# Patient Record
Sex: FEMALE | Race: WHITE | NOT HISPANIC OR LATINO | Employment: UNEMPLOYED | URBAN - NONMETROPOLITAN AREA
[De-identification: names, ages, dates, MRNs, and addresses within clinical notes are randomized per-mention and may not be internally consistent; named-entity substitution may affect disease eponyms.]

---

## 2017-01-10 ENCOUNTER — TELEPHONE (OUTPATIENT)
Dept: ORTHOPEDIC SURGERY | Facility: CLINIC | Age: 62
End: 2017-01-10

## 2017-02-07 NOTE — TELEPHONE ENCOUNTER
----- Message from Delphine Goodwin sent at 2/7/2017  2:09 PM CST -----  PATIENT CALLED STATING SHE HAD AN EPIDURAL ON 12/21/2016 AND HAD BEEN DOING GREAT UNTIL LAST Saturday WHEN SHE STARTED HAVING SEVERE BACK PAIN.  SHE IS REQUESTING A NEW PRESCRIPTION FOR PERCOCET 10MG.  THAT IS WHAT SHE HAD BEEN TAKING IT BEFORE.

## 2017-02-08 RX ORDER — OXYCODONE AND ACETAMINOPHEN 7.5; 325 MG/1; MG/1
1 TABLET ORAL EVERY 6 HOURS PRN
Qty: 30 TABLET | Refills: 0 | Status: SHIPPED | OUTPATIENT
Start: 2017-02-08 | End: 2017-02-20 | Stop reason: SDUPTHER

## 2017-02-20 RX ORDER — OXYCODONE AND ACETAMINOPHEN 7.5; 325 MG/1; MG/1
1 TABLET ORAL EVERY 6 HOURS PRN
Qty: 70 TABLET | Refills: 0 | Status: SHIPPED | OUTPATIENT
Start: 2017-02-20 | End: 2017-03-24 | Stop reason: SDUPTHER

## 2017-02-20 NOTE — TELEPHONE ENCOUNTER
----- Message from Lakeshia Cruz sent at 2/20/2017  1:23 PM CST -----  BB- oxycodone 7.5 because next available appointment is on MARCH 24,17

## 2017-03-24 ENCOUNTER — OFFICE VISIT (OUTPATIENT)
Dept: ORTHOPEDIC SURGERY | Facility: CLINIC | Age: 62
End: 2017-03-24

## 2017-03-24 VITALS — WEIGHT: 188 LBS | HEIGHT: 65 IN | BODY MASS INDEX: 31.32 KG/M2

## 2017-03-24 DIAGNOSIS — M54.50 LOW BACK PAIN WITHOUT SCIATICA, UNSPECIFIED BACK PAIN LATERALITY, UNSPECIFIED CHRONICITY: ICD-10-CM

## 2017-03-24 DIAGNOSIS — M51.26 DISPLACEMENT OF LUMBAR INTERVERTEBRAL DISC WITHOUT MYELOPATHY: Primary | ICD-10-CM

## 2017-03-24 DIAGNOSIS — M48.061 SPINAL STENOSIS OF LUMBAR REGION: ICD-10-CM

## 2017-03-24 PROCEDURE — 99213 OFFICE O/P EST LOW 20 MIN: CPT | Performed by: ORTHOPAEDIC SURGERY

## 2017-03-24 RX ORDER — OXYCODONE AND ACETAMINOPHEN 7.5; 325 MG/1; MG/1
1 TABLET ORAL EVERY 6 HOURS PRN
Qty: 70 TABLET | Refills: 0 | Status: SHIPPED | OUTPATIENT
Start: 2017-03-24 | End: 2017-04-07 | Stop reason: SDUPTHER

## 2017-03-24 NOTE — PROGRESS NOTES
"Flaca Bhandari is a 61 y.o. female returns for     Chief Complaint   Patient presents with   • Lumbar Spine - Follow-up       HISTORY OF PRESENT ILLNESS: Patients last epidural injection was 12/22/16  States epidural injection was helpful, and had significant improvement.  Complains of back pain.         CONCURRENT MEDICAL HISTORY:    Past Medical History:   Diagnosis Date   • Anxiety        Allergies   Allergen Reactions   • Methocarbamol          Current Outpatient Prescriptions:   •  amitriptyline (ELAVIL) 25 MG tablet, Take 25 mg by mouth every night., Disp: , Rfl:   •  aspirin 81 MG chewable tablet, Chew 81 mg daily., Disp: , Rfl:   •  atorvastatin (LIPITOR) 80 MG tablet, Take 80 mg by mouth daily., Disp: , Rfl:   •  cyclobenzaprine (FLEXERIL) 10 MG tablet, Take 10 mg by mouth 3 (three) times a day as needed for muscle spasms., Disp: , Rfl:   •  diclofenac sodium (VOTAREN XR) 100 MG 24 hr tablet, TAKE 1 TABLET DAILY., Disp: , Rfl: 1  •  HYDROcodone-acetaminophen (NORCO)  MG per tablet, Take 1 tablet by mouth Every 6 (Six) Hours As Needed for moderate pain (4-6)., Disp: 70 tablet, Rfl: 0  •  LORazepam (ATIVAN) 1 MG tablet, Take 1 mg by mouth every 8 (eight) hours as needed for anxiety., Disp: , Rfl:   •  oxyCODONE-acetaminophen (PERCOCET) 7.5-325 MG per tablet, Take 1 tablet by mouth Every 6 (Six) Hours As Needed for moderate pain (4-6)., Disp: 70 tablet, Rfl: 0  •  venlafaxine XR (EFFEXOR-XR) 75 MG 24 hr capsule, , Disp: , Rfl:     Past Surgical History:   Procedure Laterality Date   • HYSTERECTOMY  1985   • SPINE SURGERY  1997       ROS  No fevers or chills.  No chest pain or shortness of air.  No GI or  disturbances.    PHYSICAL EXAMINATION:       Ht 65\" (165.1 cm)  Wt 188 lb (85.3 kg)  BMI 31.28 kg/m2    Physical Exam    GAIT:     []  Normal  []  Antalgic    Assistive device: []  None  []  Walker     []  Crutches  []  Cane     []  Wheelchair  []  Stretcher    Right Ankle Exam     Muscle Strength "   Dorsiflexion:  5/5  Plantar flexion:  5/5  Anterior tibial:  5/5  Posterior tibial:  5/5  Gastrocsoleus:  5/5  Peroneal muscle:  5/5      Left Ankle Exam     Muscle Strength   Dorsiflexion:  5/5   Plantar flexion:  5/5   Anterior tibial:  5/5   Posterior tibial:  5/5  Gastrocsoleus:  5/5  Peroneal muscle:  5/5      Right Hip Exam     Range of Motion   Internal Rotation: normal   External Rotation: normal     Muscle Strength   Abduction: 5/5   Flexion: 5/5       Left Hip Exam     Range of Motion   Internal Rotation: normal   External Rotation: normal     Muscle Strength   Abduction: 5/5   Flexion: 5/5       Back Exam     Tenderness   The patient is experiencing tenderness in the lumbar.    Range of Motion   Extension: 30   Flexion:  60 normal   Lateral Bend Right: 10   Lateral Bend Left: 10   Rotation Right: 30   Rotation Left: 30     Muscle Strength   Right Quadriceps:  5/5   Left Quadriceps:  5/5   Right Hamstrings:  5/5   Left Hamstrings:  5/5     Tests   Straight leg raise right: negative  Straight leg raise left: negative    Reflexes   Patellar: 2/4  Achilles: 2/4  Biceps: 2/4  Babinski's sign: normal     Other   Sensation: normal  Gait: normal   Erythema: no back redness  Scars: absent            Painful lumbar region, right lumbar.        No results found.          ASSESSMENT:    Diagnoses and all orders for this visit:    Low back pain without sciatica, unspecified back pain laterality, unspecified chronicity    Displacement of lumbar intervertebral disc without myelopathy    Spinal stenosis of lumbar region          PLAN    Schedule for another epidural injection of lumbar spine.    Ismael Jin MD

## 2017-04-07 RX ORDER — OXYCODONE AND ACETAMINOPHEN 7.5; 325 MG/1; MG/1
1 TABLET ORAL EVERY 6 HOURS PRN
Qty: 70 TABLET | Refills: 0 | Status: SHIPPED | OUTPATIENT
Start: 2017-04-07 | End: 2017-05-05

## 2017-04-07 NOTE — TELEPHONE ENCOUNTER
----- Message from Pam Oglesby sent at 4/7/2017  1:07 PM CDT -----  Dr. Jin,    Patient is requesting a refill on her Percocet 7.5mg.    Pt's # [phone]    -Pam

## 2017-04-21 ENCOUNTER — TELEPHONE (OUTPATIENT)
Dept: ORTHOPEDIC SURGERY | Facility: CLINIC | Age: 62
End: 2017-04-21

## 2017-04-21 DIAGNOSIS — M51.26 DISPLACEMENT OF LUMBAR INTERVERTEBRAL DISC WITHOUT MYELOPATHY: ICD-10-CM

## 2017-04-21 DIAGNOSIS — M48.061 SPINAL STENOSIS OF LUMBAR REGION: Primary | ICD-10-CM

## 2017-04-26 ENCOUNTER — HOSPITAL ENCOUNTER (OUTPATIENT)
Dept: INTERVENTIONAL RADIOLOGY/VASCULAR | Facility: HOSPITAL | Age: 62
Discharge: HOME | End: 2017-04-26
Admitting: ORTHOPAEDIC SURGERY

## 2017-04-26 VITALS
SYSTOLIC BLOOD PRESSURE: 153 MMHG | HEART RATE: 84 BPM | DIASTOLIC BLOOD PRESSURE: 76 MMHG | RESPIRATION RATE: 18 BRPM | OXYGEN SATURATION: 96 %

## 2017-04-26 DIAGNOSIS — M48.061 SPINAL STENOSIS OF LUMBAR REGION: ICD-10-CM

## 2017-04-26 DIAGNOSIS — M51.26 DISPLACEMENT OF LUMBAR INTERVERTEBRAL DISC WITHOUT MYELOPATHY: ICD-10-CM

## 2017-04-26 PROCEDURE — 25010000002 METHYLPREDNISOLONE PER 80 MG: Performed by: ORTHOPAEDIC SURGERY

## 2017-04-26 PROCEDURE — 0 IOPAMIDOL 41 % SOLUTION: Performed by: ORTHOPAEDIC SURGERY

## 2017-04-26 PROCEDURE — 64483 NJX AA&/STRD TFRM EPI L/S 1: CPT | Performed by: ORTHOPAEDIC SURGERY

## 2017-04-26 RX ORDER — BUPIVACAINE HYDROCHLORIDE 5 MG/ML
INJECTION, SOLUTION EPIDURAL; INTRACAUDAL
Status: COMPLETED | OUTPATIENT
Start: 2017-04-26 | End: 2017-04-26

## 2017-04-26 RX ORDER — METHYLPREDNISOLONE ACETATE 80 MG/ML
INJECTION, SUSPENSION INTRA-ARTICULAR; INTRALESIONAL; INTRAMUSCULAR; SOFT TISSUE
Status: COMPLETED | OUTPATIENT
Start: 2017-04-26 | End: 2017-04-26

## 2017-04-26 RX ADMIN — IOPAMIDOL 5 ML: 408 INJECTION, SOLUTION INTRATHECAL at 14:49

## 2017-04-26 RX ADMIN — BUPIVACAINE HYDROCHLORIDE 10 ML: 5 INJECTION, SOLUTION EPIDURAL; INTRACAUDAL; PERINEURAL at 14:48

## 2017-04-26 RX ADMIN — METHYLPREDNISOLONE ACETATE 80 MG: 80 INJECTION, SUSPENSION INTRA-ARTICULAR; INTRALESIONAL; INTRAMUSCULAR; SOFT TISSUE at 14:49

## 2017-05-05 RX ORDER — OXYCODONE AND ACETAMINOPHEN 10; 325 MG/1; MG/1
1 TABLET ORAL EVERY 4 HOURS PRN
Qty: 80 TABLET | Refills: 0 | Status: SHIPPED | OUTPATIENT
Start: 2017-05-05 | End: 2017-05-26 | Stop reason: SDUPTHER

## 2017-05-08 ENCOUNTER — TELEPHONE (OUTPATIENT)
Dept: ORTHOPEDIC SURGERY | Facility: CLINIC | Age: 62
End: 2017-05-08

## 2017-05-08 DIAGNOSIS — M54.5 LOW BACK PAIN, UNSPECIFIED BACK PAIN LATERALITY, UNSPECIFIED CHRONICITY, WITH SCIATICA PRESENCE UNSPECIFIED: Primary | ICD-10-CM

## 2017-05-31 RX ORDER — OXYCODONE AND ACETAMINOPHEN 10; 325 MG/1; MG/1
1 TABLET ORAL EVERY 4 HOURS PRN
Qty: 80 TABLET | Refills: 0 | Status: SHIPPED | OUTPATIENT
Start: 2017-05-31 | End: 2018-02-23

## 2017-06-16 ENCOUNTER — TELEPHONE (OUTPATIENT)
Dept: ORTHOPEDIC SURGERY | Facility: CLINIC | Age: 62
End: 2017-06-16

## 2017-06-16 DIAGNOSIS — M51.26 DISPLACEMENT OF LUMBAR INTERVERTEBRAL DISC WITHOUT MYELOPATHY: Primary | ICD-10-CM

## 2017-06-16 DIAGNOSIS — M48.061 SPINAL STENOSIS, LUMBAR REGION, WITHOUT NEUROGENIC CLAUDICATION: ICD-10-CM

## 2017-06-16 NOTE — TELEPHONE ENCOUNTER
Patient would prefer to see a Dr Peres in [place] for pain management instead of Dr Boswell.   [place] is closer for her to travel to.    Dr Peres # is [phone]     Referral will be sent to [place] for scheduling

## 2017-06-23 RX ORDER — OXYCODONE AND ACETAMINOPHEN 7.5; 325 MG/1; MG/1
1 TABLET ORAL EVERY 6 HOURS PRN
Qty: 60 TABLET | Refills: 0 | Status: SHIPPED | OUTPATIENT
Start: 2017-06-23 | End: 2018-02-23

## 2017-06-23 RX ORDER — PROMETHAZINE HCL 50 MG
25 TABLET ORAL EVERY 6 HOURS PRN
Qty: 60 TABLET | Refills: 0 | Status: SHIPPED | OUTPATIENT
Start: 2017-06-23 | End: 2018-02-23

## 2017-07-18 ENCOUNTER — APPOINTMENT (OUTPATIENT)
Dept: LAB | Facility: HOSPITAL | Age: 62
End: 2017-07-18

## 2017-07-18 ENCOUNTER — OFFICE VISIT (OUTPATIENT)
Dept: PAIN MEDICINE | Facility: CLINIC | Age: 62
End: 2017-07-18

## 2017-07-18 VITALS
HEIGHT: 65 IN | SYSTOLIC BLOOD PRESSURE: 140 MMHG | DIASTOLIC BLOOD PRESSURE: 80 MMHG | BODY MASS INDEX: 30.17 KG/M2 | WEIGHT: 181.1 LBS

## 2017-07-18 DIAGNOSIS — Z79.899 HIGH RISK MEDICATIONS (NOT ANTICOAGULANTS) LONG-TERM USE: ICD-10-CM

## 2017-07-18 DIAGNOSIS — M54.16 LUMBAR RADICULOPATHY: ICD-10-CM

## 2017-07-18 DIAGNOSIS — M51.36 DDD (DEGENERATIVE DISC DISEASE), LUMBAR: Primary | ICD-10-CM

## 2017-07-18 PROCEDURE — G0481 DRUG TEST DEF 8-14 CLASSES: HCPCS | Performed by: PAIN MEDICINE

## 2017-07-18 PROCEDURE — 80307 DRUG TEST PRSMV CHEM ANLYZR: CPT | Performed by: PAIN MEDICINE

## 2017-07-18 PROCEDURE — 99204 OFFICE O/P NEW MOD 45 MIN: CPT | Performed by: NURSE PRACTITIONER

## 2017-07-18 RX ORDER — TRAZODONE HYDROCHLORIDE 50 MG/1
TABLET ORAL
Refills: 3 | COMMUNITY
Start: 2017-07-13

## 2017-07-18 RX ORDER — GABAPENTIN 300 MG/1
300 CAPSULE ORAL 3 TIMES DAILY
Qty: 90 CAPSULE | Refills: 1 | Status: SHIPPED | OUTPATIENT
Start: 2017-07-18 | End: 2017-11-20 | Stop reason: SDUPTHER

## 2017-07-18 RX ORDER — HYDROCODONE BITARTRATE AND ACETAMINOPHEN 10; 325 MG/1; MG/1
1 TABLET ORAL 4 TIMES DAILY
Qty: 120 TABLET | Refills: 0 | Status: SHIPPED | OUTPATIENT
Start: 2017-07-18 | End: 2017-08-17

## 2017-07-18 NOTE — PROGRESS NOTES
Flaca Burdick is a 61 y.o. female.   1955    HPI:   Location: lower back  Quality: aching, throbbing and dull  Severity: 7/10  Timing: constant  Alleviating: pain medication  Aggravating: sitting  and standing      Low back pain and right post leg pain.  Right upper buttock is the worst.  Has had this about 2 years.  Had discectomy 20 years ago.  No surgery.  Has had tfesi epidurals.  They helped, but did not last very long.  Has been to PT without much relief.  Was taking MR's but not much relief.  Taking percocet but started bothering her stomach.  States hydrocodone helped previously.  Has not had opioid for about a week.  States the opioid really helps her pain control and allows her to be more active.  Has been on opioid for about 1.5 years.  Has never been on gabapentin.      MRI from November 2016    Patient Name- FLACA BURDICK  Patient ID- [id number]   Ordering- ANJEL NARANJO MD  Attending- ANJEL NARANJO MD  Referring- ANJEL NARANJO MD  ------------------------------------------------  Procedure- MRI lumbar spine without contrast      Reason for exam- Other intervertebral disc displacement, lumbar  region.      Findings- Multisequence multiplanar MR imaging of the lumbar spine was  performed without contrast. Lumbarization of the S1 vertebral body is  seen. Otherwise lumbar spine vertebral body heights and alignment are  unremarkable. There is no evidence of fracture or dislocation. L5  vertebral body oval high signal lesion on T1 and T2 weighting is seen  consistent with benign hemangioma. Conus of the spinal cord appears  normal with tip at the T12-L1 intervertebral disc space level.      T12-L1- Disc space is normal. No disc protrusion or extrusion. Spinal  cord and nerve roots exit without compromise.      L1-L2- Disc space normal. No disc protrusion or extrusion. Nerve roots  exit without compromise.      L2-L3- Disc space normal. No disc protrusion or extrusion. Nerve roots  exit  without compromise.      L3-L4- Disc space normal. No disc protrusion or extrusion. Nerve roots  exit without compromise.      L4-L5- Anterior broad-based mild disc protrusion. Nerve roots exit  this disc space level without compromise.      L5-S1- Loss of disc space height with diffuse low disc signal suggests  desiccation from degenerative disc disease. Very mild disc protrusion  minimally indenting the anterior thecal sac. Nerve roots exit without  compromise.      Impression-  1. Lumbarization of the S1 vertebral body.  2. L5-S1- Loss of disc space height with diffuse low disc signal  suggests desiccation from degenerative disc disease. Very mild disc  protrusion minimally indenting the anterior thecal sac. Nerve roots  exit without compromise.  3. L4-L5- Anterior broad-based mild disc protrusion. Nerve roots exit  this disc space level without compromise.  4. No acute MR lumbar spine abnormality.      Electronically Signed By- Medhat Reyna MD On: 2016-11-16 15:09:01      The following portions of the patient's history were reviewed by me and updated as appropriate: allergies, current medications, past family history, past medical history, past social history, past surgical history and problem list.    Past Medical History:   Diagnosis Date   • Anxiety    • Chronic pain disorder    • Depression    • Hypercholesteremia    • Low back pain        Social History     Social History   • Marital status:      Spouse name: N/A   • Number of children: N/A   • Years of education: N/A     Occupational History   • Not on file.     Social History Main Topics   • Smoking status: Never Smoker   • Smokeless tobacco: Never Used   • Alcohol use Yes      Comment: LESS THAN 12 DRINKS IN THE PAST YEAR   • Drug use: No   • Sexual activity: Defer     Other Topics Concern   • Not on file     Social History Narrative       Family History   Problem Relation Age of Onset   • Heart disease Mother    • Alcohol abuse Father    •  Hyperlipidemia Father    • Hypertension Father    • COPD Sister          Current Outpatient Prescriptions:   •  aspirin 81 MG chewable tablet, Chew 81 mg daily., Disp: , Rfl:   •  atorvastatin (LIPITOR) 80 MG tablet, Take 80 mg by mouth daily., Disp: , Rfl:   •  promethazine (PHENERGAN) 50 MG tablet, Take 0.5 tablets by mouth Every 6 (Six) Hours As Needed for Nausea or Vomiting., Disp: 60 tablet, Rfl: 0  •  venlafaxine XR (EFFEXOR-XR) 75 MG 24 hr capsule, , Disp: , Rfl:   •  amitriptyline (ELAVIL) 25 MG tablet, Take 25 mg by mouth every night., Disp: , Rfl:   •  cyclobenzaprine (FLEXERIL) 10 MG tablet, Take 10 mg by mouth 3 (three) times a day as needed for muscle spasms., Disp: , Rfl:   •  diclofenac sodium (VOTAREN XR) 100 MG 24 hr tablet, TAKE 1 TABLET DAILY., Disp: , Rfl: 1  •  gabapentin (NEURONTIN) 300 MG capsule, Take 1 capsule by mouth 3 (Three) Times a Day., Disp: 90 capsule, Rfl: 1  •  HYDROcodone-acetaminophen (NORCO)  MG per tablet, Take 1 tablet by mouth 4 (Four) Times a Day for 30 days., Disp: 120 tablet, Rfl: 0  •  LORazepam (ATIVAN) 1 MG tablet, Take 1 mg by mouth every 8 (eight) hours as needed for anxiety., Disp: , Rfl:   •  oxyCODONE-acetaminophen (PERCOCET)  MG per tablet, Take 1 tablet by mouth Every 4 (Four) Hours As Needed for Moderate Pain (4-6)., Disp: 80 tablet, Rfl: 0  •  oxyCODONE-acetaminophen (PERCOCET) 7.5-325 MG per tablet, Take 1 tablet by mouth Every 6 (Six) Hours As Needed for Moderate Pain (4-6)., Disp: 60 tablet, Rfl: 0  •  traZODone (DESYREL) 50 MG tablet, TK 1 T PO Q NIGHT, Disp: , Rfl: 3    Allergies   Allergen Reactions   • Methocarbamol        Review of Systems   Musculoskeletal: Positive for back pain.   Psychiatric/Behavioral: Positive for dysphoric mood. The patient is nervous/anxious.    All other systems reviewed and are negative.    All review of systems reviewed and negative except for above.    Physical Exam   Constitutional: She is oriented to person,  place, and time. She appears well-developed and well-nourished. She does not appear ill. No distress.   HENT:   Head: Normocephalic and atraumatic.   Right Ear: Hearing normal.   Left Ear: Hearing normal.   Eyes: Conjunctivae and EOM are normal. Pupils are equal, round, and reactive to light.   Neck: Normal range of motion and full passive range of motion without pain. Neck supple. No muscular tenderness present. Normal range of motion present.   Cardiovascular: Normal rate, regular rhythm and normal heart sounds.    Pulmonary/Chest: Effort normal and breath sounds normal.   Abdominal: Soft. Bowel sounds are normal. There is no tenderness.   Musculoskeletal:        Lumbar back: She exhibits decreased range of motion (flexion 45 deg limited by pain.  ext 10-15 deg with milder pain. ).   Neurological: She is alert and oriented to person, place, and time. She has normal strength. She displays normal reflexes. No cranial nerve deficit or sensory deficit. She exhibits normal muscle tone. Coordination and gait normal.   Reflex Scores:       Tricep reflexes are 1+ on the right side and 1+ on the left side.       Bicep reflexes are 1+ on the right side and 1+ on the left side.       Brachioradialis reflexes are 1+ on the right side and 1+ on the left side.       Patellar reflexes are 1+ on the right side and 1+ on the left side.       Achilles reflexes are 1+ on the right side and 1+ on the left side.  Skin: Skin is warm and dry. No rash noted. No erythema.   Psychiatric: She has a normal mood and affect. Her behavior is normal. Judgment normal.   Vitals reviewed.      Flaca was seen today for back pain.    Diagnoses and all orders for this visit:    DDD (degenerative disc disease), lumbar  -     ToxASSURE Select 13 (MW)    Lumbar radiculopathy  -     ToxASSURE Select 13 (MW)    High risk medications (not anticoagulants) long-term use  -     ToxASSURE Select 13 (MW)    Other orders  -     HYDROcodone-acetaminophen (NORCO)   MG per tablet; Take 1 tablet by mouth 4 (Four) Times a Day for 30 days.  -     gabapentin (NEURONTIN) 300 MG capsule; Take 1 capsule by mouth 3 (Three) Times a Day.        Medication: Patient reports appropriate usage and storage habits, Patient's opioid provides enough reflief to be more active and perform activities of daily living with less discomfort., Refill opioid medication as above and Opioid contract was read and discussed today and the patient chooses to sign..  Start gabapentin 300 po tid.  Instructed to increase slowly.  Discussed possible side effects.     Interventional: none at this time.  tfesi's have provided short term relief. May be candidate for spinal cord stim trial/implant depending on medication trx.    Rehab: none at this time.  Caused worsening symptoms in past she felt.     Behavioral: No aberrant behavior noted. [facility] Report #[id number]  was reviewed and is consistent with stated history.  Depression is treated medically and she states she is doing well with this now. Pt denies SI.    Urine drug screen Ordered today to test for drugs of abuse and prescribed medications.    ORT: 3    PHQ-9: 2          This document has been electronically signed by Carlos Boswell MD on July 18, 2017 10:28 AM

## 2017-07-26 LAB — CONV REPORT SUMMARY: NORMAL

## 2017-08-14 ENCOUNTER — OFFICE VISIT (OUTPATIENT)
Dept: PAIN MEDICINE | Facility: CLINIC | Age: 62
End: 2017-08-14

## 2017-08-14 VITALS
SYSTOLIC BLOOD PRESSURE: 120 MMHG | WEIGHT: 179.7 LBS | DIASTOLIC BLOOD PRESSURE: 70 MMHG | BODY MASS INDEX: 29.94 KG/M2 | HEIGHT: 65 IN

## 2017-08-14 DIAGNOSIS — M51.36 DDD (DEGENERATIVE DISC DISEASE), LUMBAR: ICD-10-CM

## 2017-08-14 DIAGNOSIS — M54.16 LUMBAR RADICULOPATHY: Primary | ICD-10-CM

## 2017-08-14 DIAGNOSIS — Z79.899 HIGH RISK MEDICATIONS (NOT ANTICOAGULANTS) LONG-TERM USE: ICD-10-CM

## 2017-08-14 PROCEDURE — 99214 OFFICE O/P EST MOD 30 MIN: CPT | Performed by: NURSE PRACTITIONER

## 2017-08-14 NOTE — PROGRESS NOTES
Flaca Bhandari is a 61 y.o. female.   1955    HPI:   Location: lower back  Quality: aching, throbbing and dull  Severity: 7/10  Timing: constant  Alleviating: pain medication  Aggravating: sitting  and standing     Feels though gabapentin has improved her symptoms in her legs.  Still having back pain. Patient denies side effects, she reports that her opioid medication still provides significant relief and allows her to be more active.      The following portions of the patient's history were reviewed by me and updated as appropriate: allergies, current medications, past family history, past medical history, past social history, past surgical history and problem list.    Past Medical History:   Diagnosis Date   • Anxiety    • Chronic pain disorder    • Depression    • Hypercholesteremia    • Low back pain        Social History     Social History   • Marital status:      Spouse name: N/A   • Number of children: N/A   • Years of education: N/A     Occupational History   • Not on file.     Social History Main Topics   • Smoking status: Never Smoker   • Smokeless tobacco: Never Used   • Alcohol use Yes      Comment: LESS THAN 12 DRINKS IN THE PAST YEAR   • Drug use: No   • Sexual activity: Defer     Other Topics Concern   • Not on file     Social History Narrative       Family History   Problem Relation Age of Onset   • Heart disease Mother    • Alcohol abuse Father    • Hyperlipidemia Father    • Hypertension Father    • COPD Sister          Current Outpatient Prescriptions:   •  amitriptyline (ELAVIL) 25 MG tablet, Take 25 mg by mouth every night., Disp: , Rfl:   •  aspirin 81 MG chewable tablet, Chew 81 mg daily., Disp: , Rfl:   •  atorvastatin (LIPITOR) 80 MG tablet, Take 80 mg by mouth daily., Disp: , Rfl:   •  cyclobenzaprine (FLEXERIL) 10 MG tablet, Take 10 mg by mouth 3 (three) times a day as needed for muscle spasms., Disp: , Rfl:   •  diclofenac sodium (VOTAREN XR) 100 MG 24 hr tablet, TAKE 1 TABLET  DAILY., Disp: , Rfl: 1  •  gabapentin (NEURONTIN) 300 MG capsule, Take 1 capsule by mouth 3 (Three) Times a Day., Disp: 90 capsule, Rfl: 1  •  HYDROcodone-acetaminophen (NORCO)  MG per tablet, Take 1 tablet by mouth 4 (Four) Times a Day for 30 days., Disp: 120 tablet, Rfl: 0  •  LORazepam (ATIVAN) 1 MG tablet, Take 1 mg by mouth every 8 (eight) hours as needed for anxiety., Disp: , Rfl:   •  oxyCODONE-acetaminophen (PERCOCET)  MG per tablet, Take 1 tablet by mouth Every 4 (Four) Hours As Needed for Moderate Pain (4-6)., Disp: 80 tablet, Rfl: 0  •  oxyCODONE-acetaminophen (PERCOCET) 7.5-325 MG per tablet, Take 1 tablet by mouth Every 6 (Six) Hours As Needed for Moderate Pain (4-6)., Disp: 60 tablet, Rfl: 0  •  promethazine (PHENERGAN) 50 MG tablet, Take 0.5 tablets by mouth Every 6 (Six) Hours As Needed for Nausea or Vomiting., Disp: 60 tablet, Rfl: 0  •  traZODone (DESYREL) 50 MG tablet, TK 1 T PO Q NIGHT, Disp: , Rfl: 3  •  venlafaxine XR (EFFEXOR-XR) 75 MG 24 hr capsule, , Disp: , Rfl:     Allergies   Allergen Reactions   • Methocarbamol        Review of Systems   Musculoskeletal: Positive for back pain.   All other systems reviewed and are negative.    All systems reviewed and negative except for above.    Physical Exam   Constitutional: She appears well-developed and well-nourished. No distress.   Musculoskeletal:        Lumbar back: She exhibits tenderness (mild).   Neurological: She is alert.   Neg slr b   Psychiatric: She has a normal mood and affect. Her behavior is normal. Judgment normal.       Flaca was seen today for back pain.    Diagnoses and all orders for this visit:    Lumbar radiculopathy    DDD (degenerative disc disease), lumbar    High risk medications (not anticoagulants) long-term use      Medication: Patient reports no negative side effects, Patient reports appropriate usage and storage habits, Patient's opioid provides enough reflief to be more active and perform activities of  daily living with less discomfort. and Refill opioid medication as above.  I will increase her slowly to gabapentin 600 by mouth 3 times a day I will give her 300 mg tablets so that she may do this easily.  She was instructed on how to increase.  Handwritten prescriptions.    Interventional: none at this time    Rehab: none at this time    Behavioral: No aberrant behavior noted. [facility] Report #[id number]  was reviewed and is consistent with stated history    Urine drug screen Reviewed from last visit and is appropriately neg          This document has been electronically signed by Carlos Boswell MD on August 14, 2017 11:21 AM

## 2017-10-04 ENCOUNTER — OFFICE VISIT (OUTPATIENT)
Dept: PAIN MEDICINE | Facility: CLINIC | Age: 62
End: 2017-10-04

## 2017-10-04 VITALS
BODY MASS INDEX: 30.41 KG/M2 | HEIGHT: 65 IN | WEIGHT: 182.5 LBS | SYSTOLIC BLOOD PRESSURE: 122 MMHG | DIASTOLIC BLOOD PRESSURE: 80 MMHG

## 2017-10-04 DIAGNOSIS — M54.16 LUMBAR RADICULOPATHY: Primary | ICD-10-CM

## 2017-10-04 DIAGNOSIS — Z79.899 HIGH RISK MEDICATIONS (NOT ANTICOAGULANTS) LONG-TERM USE: ICD-10-CM

## 2017-10-04 DIAGNOSIS — M51.36 DDD (DEGENERATIVE DISC DISEASE), LUMBAR: ICD-10-CM

## 2017-10-04 DIAGNOSIS — M70.61 TROCHANTERIC BURSITIS OF RIGHT HIP: ICD-10-CM

## 2017-10-04 PROCEDURE — 99213 OFFICE O/P EST LOW 20 MIN: CPT | Performed by: NURSE PRACTITIONER

## 2017-10-04 RX ORDER — OXYCODONE AND ACETAMINOPHEN 10; 325 MG/1; MG/1
1 TABLET ORAL 4 TIMES DAILY
Qty: 120 TABLET | Refills: 0 | Status: SHIPPED | OUTPATIENT
Start: 2017-10-04 | End: 2017-11-03

## 2017-10-04 NOTE — PROGRESS NOTES
Flaca Bhandari is a 61 y.o. female.   1955    HPI:   Location: lower back  Quality: aching, throbbing and dull  Severity: 7/10  Timing: constant  Alleviating: pain medication  Aggravating: sitting  and standing      Patient reports that the opioid medication still provides her relief and allows more activity than she would have without the opioid medication, however, the norco does not seem to last as long as percocet did.  She would like to switch back if possible.  She denies side effects.        The following portions of the patient's history were reviewed by me and updated as appropriate: allergies, current medications, past family history, past medical history, past social history, past surgical history and problem list.    Past Medical History:   Diagnosis Date   • Anxiety    • Chronic pain disorder    • Depression    • Hypercholesteremia    • Low back pain        Social History     Social History   • Marital status:      Spouse name: N/A   • Number of children: N/A   • Years of education: N/A     Occupational History   • Not on file.     Social History Main Topics   • Smoking status: Never Smoker   • Smokeless tobacco: Never Used   • Alcohol use Yes      Comment: LESS THAN 12 DRINKS IN THE PAST YEAR   • Drug use: No   • Sexual activity: Defer     Other Topics Concern   • Not on file     Social History Narrative       Family History   Problem Relation Age of Onset   • Heart disease Mother    • Alcohol abuse Father    • Hyperlipidemia Father    • Hypertension Father    • COPD Sister          Current Outpatient Prescriptions:   •  amitriptyline (ELAVIL) 25 MG tablet, Take 25 mg by mouth every night., Disp: , Rfl:   •  aspirin 81 MG chewable tablet, Chew 81 mg daily., Disp: , Rfl:   •  cyclobenzaprine (FLEXERIL) 10 MG tablet, Take 10 mg by mouth 3 (three) times a day as needed for muscle spasms., Disp: , Rfl:   •  gabapentin (NEURONTIN) 300 MG capsule, Take 1 capsule by mouth 3 (Three) Times a Day.,  Disp: 90 capsule, Rfl: 1  •  LORazepam (ATIVAN) 1 MG tablet, Take 1 mg by mouth every 8 (eight) hours as needed for anxiety., Disp: , Rfl:   •  oxyCODONE-acetaminophen (PERCOCET)  MG per tablet, Take 1 tablet by mouth Every 4 (Four) Hours As Needed for Moderate Pain (4-6)., Disp: 80 tablet, Rfl: 0  •  oxyCODONE-acetaminophen (PERCOCET) 7.5-325 MG per tablet, Take 1 tablet by mouth Every 6 (Six) Hours As Needed for Moderate Pain (4-6)., Disp: 60 tablet, Rfl: 0  •  promethazine (PHENERGAN) 50 MG tablet, Take 0.5 tablets by mouth Every 6 (Six) Hours As Needed for Nausea or Vomiting., Disp: 60 tablet, Rfl: 0  •  traZODone (DESYREL) 50 MG tablet, TK 1 T PO Q NIGHT, Disp: , Rfl: 3  •  venlafaxine XR (EFFEXOR-XR) 75 MG 24 hr capsule, , Disp: , Rfl:   •  atorvastatin (LIPITOR) 80 MG tablet, Take 80 mg by mouth daily., Disp: , Rfl:   •  diclofenac sodium (VOTAREN XR) 100 MG 24 hr tablet, TAKE 1 TABLET DAILY., Disp: , Rfl: 1  •  oxyCODONE-acetaminophen (PERCOCET)  MG per tablet, Take 1 tablet by mouth 4 (Four) Times a Day for 30 days., Disp: 120 tablet, Rfl: 0  •  oxyCODONE-acetaminophen (PERCOCET)  MG per tablet, Take 1 tablet by mouth 4 (Four) Times a Day for 30 days., Disp: 120 tablet, Rfl: 0    Allergies   Allergen Reactions   • Methocarbamol        Review of Systems   Musculoskeletal: Positive for back pain.   All other systems reviewed and are negative.    All systems reviewed and negative except for above.    Physical Exam   Constitutional: She appears well-developed and well-nourished. No distress.   Musculoskeletal:        Lumbar back: She exhibits tenderness (mild).   ttp right gtb   Neurological: She is alert.   Neg slr b   Psychiatric: She has a normal mood and affect. Her behavior is normal. Judgment normal.       Flaca was seen today for back pain.    Diagnoses and all orders for this visit:    Lumbar radiculopathy    DDD (degenerative disc disease), lumbar    High risk medications (not  anticoagulants) long-term use    Trochanteric bursitis of right hip  -     IR inject/asp large joint or bursa right; Future    Other orders  -     oxyCODONE-acetaminophen (PERCOCET)  MG per tablet; Take 1 tablet by mouth 4 (Four) Times a Day for 30 days.  -     oxyCODONE-acetaminophen (PERCOCET)  MG per tablet; Take 1 tablet by mouth 4 (Four) Times a Day for 30 days.        Medication: Patient reports no negative side effects, Patient reports appropriate usage and storage habits and Refill opioid medication as above.  Change back to percocet.     Interventional:  I have discussed the risks and benefits of the procedure with the patient.   Right gtb injection    Rehab: none at this time    Behavioral: No aberrant behavior noted. [facility] Report # [id number] was reviewed and is consistent with stated history    Urine drug screen None at this time          This document has been electronically signed by Carlos Boswell MD on October 4, 2017 3:20 PM

## 2017-10-19 ENCOUNTER — HOSPITAL ENCOUNTER (OUTPATIENT)
Dept: INTERVENTIONAL RADIOLOGY/VASCULAR | Facility: HOSPITAL | Age: 62
Discharge: HOME | End: 2017-10-19
Attending: PAIN MEDICINE | Admitting: PAIN MEDICINE

## 2017-10-19 VITALS
HEART RATE: 73 BPM | DIASTOLIC BLOOD PRESSURE: 77 MMHG | SYSTOLIC BLOOD PRESSURE: 132 MMHG | OXYGEN SATURATION: 94 % | RESPIRATION RATE: 18 BRPM

## 2017-10-19 DIAGNOSIS — M70.61 TROCHANTERIC BURSITIS OF RIGHT HIP: ICD-10-CM

## 2017-10-19 PROCEDURE — 0 IOPAMIDOL 41 % SOLUTION: Performed by: PAIN MEDICINE

## 2017-10-19 PROCEDURE — 20610 DRAIN/INJ JOINT/BURSA W/O US: CPT | Performed by: PAIN MEDICINE

## 2017-10-19 PROCEDURE — 77002 NEEDLE LOCALIZATION BY XRAY: CPT

## 2017-10-19 PROCEDURE — 25010000002 METHYLPREDNISOLONE PER 40 MG: Performed by: PAIN MEDICINE

## 2017-10-19 RX ORDER — METHYLPREDNISOLONE ACETATE 40 MG/ML
INJECTION, SUSPENSION INTRA-ARTICULAR; INTRALESIONAL; INTRAMUSCULAR; SOFT TISSUE
Status: COMPLETED | OUTPATIENT
Start: 2017-10-19 | End: 2017-10-19

## 2017-10-19 RX ORDER — BUPIVACAINE HYDROCHLORIDE 5 MG/ML
INJECTION, SOLUTION EPIDURAL; INTRACAUDAL
Status: COMPLETED | OUTPATIENT
Start: 2017-10-19 | End: 2017-10-19

## 2017-10-19 RX ORDER — LIDOCAINE HYDROCHLORIDE 10 MG/ML
INJECTION, SOLUTION EPIDURAL; INFILTRATION; INTRACAUDAL; PERINEURAL
Status: COMPLETED | OUTPATIENT
Start: 2017-10-19 | End: 2017-10-19

## 2017-10-19 RX ADMIN — BUPIVACAINE HYDROCHLORIDE 2 ML: 5 INJECTION, SOLUTION EPIDURAL; INTRACAUDAL; PERINEURAL at 13:45

## 2017-10-19 RX ADMIN — IOPAMIDOL 1 ML: 408 INJECTION, SOLUTION INTRATHECAL at 13:46

## 2017-10-19 RX ADMIN — LIDOCAINE HYDROCHLORIDE 1 ML: 10 INJECTION, SOLUTION EPIDURAL; INFILTRATION; INTRACAUDAL; PERINEURAL at 13:45

## 2017-10-19 RX ADMIN — METHYLPREDNISOLONE ACETATE 40 MG: 40 INJECTION, SUSPENSION INTRA-ARTICULAR; INTRALESIONAL; INTRAMUSCULAR; SOFT TISSUE at 13:45

## 2017-11-20 RX ORDER — GABAPENTIN 300 MG/1
CAPSULE ORAL
Qty: 180 CAPSULE | Refills: 0 | Status: SHIPPED | OUTPATIENT
Start: 2017-11-20 | End: 2017-12-06 | Stop reason: SDUPTHER

## 2017-12-06 ENCOUNTER — OFFICE VISIT (OUTPATIENT)
Dept: PAIN MEDICINE | Facility: CLINIC | Age: 62
End: 2017-12-06

## 2017-12-06 VITALS
BODY MASS INDEX: 30.37 KG/M2 | WEIGHT: 182.3 LBS | DIASTOLIC BLOOD PRESSURE: 80 MMHG | HEIGHT: 65 IN | SYSTOLIC BLOOD PRESSURE: 142 MMHG

## 2017-12-06 DIAGNOSIS — M70.61 TROCHANTERIC BURSITIS OF RIGHT HIP: ICD-10-CM

## 2017-12-06 DIAGNOSIS — M51.36 DDD (DEGENERATIVE DISC DISEASE), LUMBAR: ICD-10-CM

## 2017-12-06 DIAGNOSIS — Z79.899 HIGH RISK MEDICATIONS (NOT ANTICOAGULANTS) LONG-TERM USE: ICD-10-CM

## 2017-12-06 DIAGNOSIS — M54.16 LUMBAR RADICULOPATHY: Primary | ICD-10-CM

## 2017-12-06 PROCEDURE — 99213 OFFICE O/P EST LOW 20 MIN: CPT | Performed by: PAIN MEDICINE

## 2017-12-06 RX ORDER — PHENAZOPYRIDINE HYDROCHLORIDE 100 MG/1
100 TABLET, FILM COATED ORAL
COMMUNITY
Start: 2017-12-04 | End: 2017-12-08

## 2017-12-06 RX ORDER — GABAPENTIN 300 MG/1
300 CAPSULE ORAL 4 TIMES DAILY
Qty: 120 CAPSULE | Refills: 3 | Status: SHIPPED | OUTPATIENT
Start: 2017-12-06

## 2017-12-06 RX ORDER — OXYCODONE AND ACETAMINOPHEN 10; 325 MG/1; MG/1
1 TABLET ORAL 4 TIMES DAILY
Qty: 120 TABLET | Refills: 0 | Status: SHIPPED | OUTPATIENT
Start: 2017-12-06 | End: 2018-01-05

## 2017-12-06 RX ORDER — CIPROFLOXACIN 500 MG/1
500 TABLET, FILM COATED ORAL
COMMUNITY
Start: 2017-12-04 | End: 2017-12-12

## 2017-12-06 NOTE — PROGRESS NOTES
Flaca Bhandari is a 61 y.o. female.   1955    HPI:   Location: lower back  Quality: aching, throbbing and dull  Severity: 8-9/10  Timing: constant  Alleviating: pain medication  Aggravating: sitting  and standing      GTB injection has helped for nearly 2 months.  Dealing with UTI.  Patient denies side effects, she reports that her opioid medication still provides significant relief and allows her to be more active.        The following portions of the patient's history were reviewed by me and updated as appropriate: allergies, current medications, past family history, past medical history, past social history, past surgical history and problem list.    Past Medical History:   Diagnosis Date   • Anxiety    • Chronic pain disorder    • Depression    • Hypercholesteremia    • Low back pain        Social History     Social History   • Marital status:      Spouse name: N/A   • Number of children: N/A   • Years of education: N/A     Occupational History   • Not on file.     Social History Main Topics   • Smoking status: Never Smoker   • Smokeless tobacco: Never Used   • Alcohol use Yes      Comment: LESS THAN 12 DRINKS IN THE PAST YEAR   • Drug use: No   • Sexual activity: Defer     Other Topics Concern   • Not on file     Social History Narrative       Family History   Problem Relation Age of Onset   • Heart disease Mother    • Alcohol abuse Father    • Hyperlipidemia Father    • Hypertension Father    • COPD Sister          Current Outpatient Prescriptions:   •  amitriptyline (ELAVIL) 25 MG tablet, Take 25 mg by mouth every night., Disp: , Rfl:   •  aspirin 81 MG chewable tablet, Chew 81 mg daily., Disp: , Rfl:   •  atorvastatin (LIPITOR) 80 MG tablet, Take 80 mg by mouth daily., Disp: , Rfl:   •  ciprofloxacin (CIPRO) 500 MG tablet, Take 500 mg by mouth., Disp: , Rfl:   •  cyclobenzaprine (FLEXERIL) 10 MG tablet, Take 10 mg by mouth 3 (three) times a day as needed for muscle spasms., Disp: , Rfl:   •   diclofenac sodium (VOTAREN XR) 100 MG 24 hr tablet, TAKE 1 TABLET DAILY., Disp: , Rfl: 1  •  gabapentin (NEURONTIN) 300 MG capsule, Take 1 capsule by mouth 4 (Four) Times a Day., Disp: 120 capsule, Rfl: 3  •  LORazepam (ATIVAN) 1 MG tablet, Take 1 mg by mouth every 8 (eight) hours as needed for anxiety., Disp: , Rfl:   •  oxyCODONE-acetaminophen (PERCOCET)  MG per tablet, Take 1 tablet by mouth Every 4 (Four) Hours As Needed for Moderate Pain (4-6)., Disp: 80 tablet, Rfl: 0  •  oxyCODONE-acetaminophen (PERCOCET) 7.5-325 MG per tablet, Take 1 tablet by mouth Every 6 (Six) Hours As Needed for Moderate Pain (4-6)., Disp: 60 tablet, Rfl: 0  •  phenazopyridine (PYRIDIUM) 100 MG tablet, Take 100 mg by mouth., Disp: , Rfl:   •  promethazine (PHENERGAN) 50 MG tablet, Take 0.5 tablets by mouth Every 6 (Six) Hours As Needed for Nausea or Vomiting., Disp: 60 tablet, Rfl: 0  •  traZODone (DESYREL) 50 MG tablet, TK 1 T PO Q NIGHT, Disp: , Rfl: 3  •  venlafaxine XR (EFFEXOR-XR) 75 MG 24 hr capsule, , Disp: , Rfl:   •  oxyCODONE-acetaminophen (PERCOCET)  MG per tablet, Take 1 tablet by mouth 4 (Four) Times a Day for 30 days., Disp: 120 tablet, Rfl: 0  •  oxyCODONE-acetaminophen (PERCOCET)  MG per tablet, Take 1 tablet by mouth 4 (Four) Times a Day for 30 days., Disp: 120 tablet, Rfl: 0    Allergies   Allergen Reactions   • Alendronate      GERD   • Methocarbamol Other (See Comments)     Jittery       Review of Systems   Musculoskeletal: Positive for back pain (lower).   All other systems reviewed and are negative.    All systems reviewed and negative except for above.    Physical Exam   Constitutional: She appears well-developed and well-nourished. No distress.   Musculoskeletal:        Lumbar back: She exhibits decreased range of motion (flexion 45 deg and ext 20deg) and tenderness (mild).   Mild ttp right gtb   Neurological: She is alert.   Neg slr b   Psychiatric: She has a normal mood and affect. Her behavior  is normal. Judgment normal.       Flaca was seen today for back pain.    Diagnoses and all orders for this visit:    Lumbar radiculopathy    DDD (degenerative disc disease), lumbar    High risk medications (not anticoagulants) long-term use    Trochanteric bursitis of right hip    Other orders  -     oxyCODONE-acetaminophen (PERCOCET)  MG per tablet; Take 1 tablet by mouth 4 (Four) Times a Day for 30 days.  -     oxyCODONE-acetaminophen (PERCOCET)  MG per tablet; Take 1 tablet by mouth 4 (Four) Times a Day for 30 days.  -     gabapentin (NEURONTIN) 300 MG capsule; Take 1 capsule by mouth 4 (Four) Times a Day.        Medication: Patient reports no negative side effects, Patient reports appropriate usage and storage habits, Patient's opioid provides enough relief to be more active and perform activities of daily living with less discomfort. and Refill opioid medication as above.   I explained that the pain clinic will be closing after the first of the year.  I let the pt know where I will be located.  Refill gabapentin to 300 qid.  States she increased to 600 tid and this did not help any more than taking 300/300/600.    Interventional: none at this time.  Right GTB helped.  May repeat in future.     Rehab: .Encouraged daily stretching and gentle rom exercises.    Behavioral: No aberrant behavior noted. [facility] Report #[id number]  was reviewed and is consistent with stated history    Urine drug screen None at this time          This document has been electronically signed by Carlos Boswell MD on December 6, 2017 3:09 PM

## 2018-02-12 ENCOUNTER — TRANSCRIBE ORDERS (OUTPATIENT)
Dept: ORTHOPEDIC SURGERY | Facility: CLINIC | Age: 63
End: 2018-02-12

## 2018-02-12 DIAGNOSIS — M25.511 ACUTE PAIN OF RIGHT SHOULDER: Primary | ICD-10-CM

## 2018-02-23 ENCOUNTER — OFFICE VISIT (OUTPATIENT)
Dept: ORTHOPEDIC SURGERY | Facility: CLINIC | Age: 63
End: 2018-02-23

## 2018-02-23 VITALS — WEIGHT: 178 LBS | BODY MASS INDEX: 29.66 KG/M2 | HEIGHT: 65 IN

## 2018-02-23 DIAGNOSIS — G89.29 CHRONIC RIGHT SHOULDER PAIN: Primary | ICD-10-CM

## 2018-02-23 DIAGNOSIS — M25.511 CHRONIC RIGHT SHOULDER PAIN: Primary | ICD-10-CM

## 2018-02-23 PROBLEM — F41.9 ANXIETY AND DEPRESSION: Status: ACTIVE | Noted: 2017-05-18

## 2018-02-23 PROBLEM — F32.A ANXIETY AND DEPRESSION: Status: ACTIVE | Noted: 2017-05-18

## 2018-02-23 PROBLEM — J30.1 SEASONAL ALLERGIC RHINITIS DUE TO POLLEN: Status: ACTIVE | Noted: 2017-08-22

## 2018-02-23 PROCEDURE — 20610 DRAIN/INJ JOINT/BURSA W/O US: CPT | Performed by: ORTHOPAEDIC SURGERY

## 2018-02-23 PROCEDURE — 99213 OFFICE O/P EST LOW 20 MIN: CPT | Performed by: ORTHOPAEDIC SURGERY

## 2018-02-23 RX ORDER — LIDOCAINE HYDROCHLORIDE 10 MG/ML
2 INJECTION, SOLUTION INFILTRATION; PERINEURAL
Status: COMPLETED | OUTPATIENT
Start: 2018-02-23 | End: 2018-02-23

## 2018-02-23 RX ORDER — TRIAMCINOLONE ACETONIDE 40 MG/ML
80 INJECTION, SUSPENSION INTRA-ARTICULAR; INTRAMUSCULAR
Status: COMPLETED | OUTPATIENT
Start: 2018-02-23 | End: 2018-02-23

## 2018-02-23 RX ORDER — HYDROCODONE BITARTRATE AND ACETAMINOPHEN 10; 325 MG/1; MG/1
TABLET ORAL
Refills: 0 | COMMUNITY
Start: 2018-01-31

## 2018-02-23 RX ADMIN — LIDOCAINE HYDROCHLORIDE 2 ML: 10 INJECTION, SOLUTION INFILTRATION; PERINEURAL at 15:37

## 2018-02-23 RX ADMIN — TRIAMCINOLONE ACETONIDE 80 MG: 40 INJECTION, SUSPENSION INTRA-ARTICULAR; INTRAMUSCULAR at 15:37

## 2018-02-23 NOTE — PROGRESS NOTES
Flaca Bhandari is a 62 y.o. female   Primary provider:  Elizabeth Farley DO       Chief Complaint   Patient presents with   • Right Shoulder - Establish Care     Xray 12/19/17 @ [facility]         HISTORY OF PRESENT ILLNESS:    Shoulder Injury    The right shoulder is affected. There was no injury mechanism. The quality of the pain is described as aching and stabbing. The pain is severe. Pertinent negatives include no chest pain or numbness. Exacerbated by: driving. She has tried heat and rest for the symptoms.       Complains of pain of the right shoulder.  No injury.  The pain occurred a few months ago. The pain is an aching pain.  The pain is worse with movement.  No neck pain.  The pain radiates further down the arm.         CONCURRENT MEDICAL HISTORY:    Past Medical History:   Diagnosis Date   • Anxiety    • Chronic pain disorder    • Depression    • Hypercholesteremia    • Low back pain        Allergies   Allergen Reactions   • Alendronate      GERD   • Methocarbamol Other (See Comments)     Jittery         Current Outpatient Prescriptions:   •  aspirin 81 MG chewable tablet, Chew 81 mg daily., Disp: , Rfl:   •  atorvastatin (LIPITOR) 80 MG tablet, Take 80 mg by mouth daily., Disp: , Rfl:   •  gabapentin (NEURONTIN) 300 MG capsule, Take 1 capsule by mouth 4 (Four) Times a Day., Disp: 120 capsule, Rfl: 3  •  HYDROcodone-acetaminophen (NORCO)  MG per tablet, TK 1 T PO QID, Disp: , Rfl: 0  •  traZODone (DESYREL) 50 MG tablet, TK 1 T PO Q NIGHT, Disp: , Rfl: 3  •  venlafaxine XR (EFFEXOR-XR) 75 MG 24 hr capsule, , Disp: , Rfl:     Past Surgical History:   Procedure Laterality Date   • BACK SURGERY     • HYSTERECTOMY  1985   • SPINE SURGERY  1997       Family History   Problem Relation Age of Onset   • Heart disease Mother    • Alcohol abuse Father    • Hyperlipidemia Father    • Hypertension Father    • COPD Sister         Social History     Social History   • Marital status:       "Spouse name: N/A   • Number of children: N/A   • Years of education: N/A     Occupational History   • Not on file.     Social History Main Topics   • Smoking status: Never Smoker   • Smokeless tobacco: Never Used   • Alcohol use Yes      Comment: LESS THAN 12 DRINKS IN THE PAST YEAR   • Drug use: No   • Sexual activity: Defer     Other Topics Concern   • Not on file     Social History Narrative        Review of Systems   Constitutional: Negative for appetite change, chills, diaphoresis, fatigue, fever and unexpected weight change.   HENT: Negative.  Negative for congestion, dental problem, facial swelling, hearing loss, nosebleeds, postnasal drip, trouble swallowing and voice change.    Eyes: Negative.  Negative for pain, discharge, redness and itching.   Respiratory: Negative.  Negative for cough, choking, chest tightness, shortness of breath, wheezing and stridor.    Cardiovascular: Negative.  Negative for chest pain, palpitations and leg swelling.   Gastrointestinal: Negative.  Negative for abdominal pain, blood in stool, constipation, diarrhea and nausea.   Endocrine: Negative.  Negative for cold intolerance and heat intolerance.   Genitourinary: Negative.  Negative for dysuria, frequency, hematuria and urgency.   Musculoskeletal: Positive for arthralgias, back pain, joint swelling and myalgias. Negative for gait problem, neck pain and neck stiffness.   Skin: Negative.  Negative for pallor and rash.   Allergic/Immunologic: Negative.    Neurological: Negative for syncope, facial asymmetry, speech difficulty, weakness, numbness and headaches.   Hematological: Negative.    Psychiatric/Behavioral: Negative.  Negative for agitation, behavioral problems, confusion, decreased concentration, dysphoric mood and hallucinations. The patient is not nervous/anxious and is not hyperactive.    All other systems reviewed and are negative.      PHYSICAL EXAMINATION:       Ht 165.1 cm (65\")  Wt 80.7 kg (178 lb)  BMI 29.62 " kg/m2    Physical Exam    GAIT:     []  Normal  []  Antalgic    Assistive device: []  None  []  Walker     []  Crutches  []  Cane     []  Wheelchair  []  Stretcher    Right Shoulder Exam     Tenderness   The patient is experiencing tenderness in the acromion.    Range of Motion   Active Abduction: 100   Passive Abduction: 120   Forward Flexion: 100     Muscle Strength   Abduction: 5/5   Internal Rotation: 5/5   External Rotation: 5/5     Tests   Apprehension: negative  Cross Arm: negative  Hawkin's test: negative  Impingement: negative    Other   Erythema: absent  Sensation: normal  Pulse: present        I reviewed xray of the right shoulder, decreased joint space of the glenohumeral joint, with proximal migration.        12/19/17 xray right shoulder  CONCLUSION:   1. Glenohumeral arthropathy.         ASSESSMENT:    Diagnoses and all orders for this visit:    Chronic right shoulder pain  -     Ambulatory Referral to Physical Therapy Evaluate and treat, Ortho  -     Large Joint Arthrocentesis    Other orders  -     HYDROcodone-acetaminophen (NORCO)  MG per tablet; TK 1 T PO QID      Large Joint Arthrocentesis  Date/Time: 2/23/2018 3:37 PM  Site marked: site marked  Supporting Documentation  Indications: pain   Procedure Details  Location: shoulder - R subacromial bursa  Preparation: Patient was prepped and draped in the usual sterile fashion  Needle size: 22 G  Medications administered: 2 mL lidocaine 1 %; 80 mg triamcinolone acetonide 40 MG/ML            PLAN    Order is given for physical therapy for right shoulder.  Non operative treatment.    Injection is given for right shoulder.      Ismael Jin MD

## 2018-04-23 ENCOUNTER — OFFICE VISIT (OUTPATIENT)
Dept: ORTHOPEDIC SURGERY | Facility: CLINIC | Age: 63
End: 2018-04-23

## 2018-04-23 VITALS — HEIGHT: 65 IN | BODY MASS INDEX: 28.66 KG/M2 | WEIGHT: 172 LBS

## 2018-04-23 DIAGNOSIS — G89.29 CHRONIC RIGHT SHOULDER PAIN: Primary | ICD-10-CM

## 2018-04-23 DIAGNOSIS — M25.511 CHRONIC RIGHT SHOULDER PAIN: Primary | ICD-10-CM

## 2018-04-23 PROCEDURE — 20610 DRAIN/INJ JOINT/BURSA W/O US: CPT | Performed by: ORTHOPAEDIC SURGERY

## 2018-04-23 PROCEDURE — 99213 OFFICE O/P EST LOW 20 MIN: CPT | Performed by: ORTHOPAEDIC SURGERY

## 2018-04-23 RX ORDER — TRIAMCINOLONE ACETONIDE 40 MG/ML
40 INJECTION, SUSPENSION INTRA-ARTICULAR; INTRAMUSCULAR ONCE
Status: SHIPPED | OUTPATIENT
Start: 2018-04-23

## 2018-04-23 RX ORDER — TRIAMCINOLONE ACETONIDE 40 MG/ML
40 INJECTION, SUSPENSION INTRA-ARTICULAR; INTRAMUSCULAR
Status: COMPLETED | OUTPATIENT
Start: 2018-04-23 | End: 2018-04-23

## 2018-04-23 RX ORDER — LIDOCAINE HYDROCHLORIDE 10 MG/ML
2 INJECTION, SOLUTION INFILTRATION; PERINEURAL
Status: COMPLETED | OUTPATIENT
Start: 2018-04-23 | End: 2018-04-23

## 2018-04-23 RX ADMIN — LIDOCAINE HYDROCHLORIDE 2 ML: 10 INJECTION, SOLUTION INFILTRATION; PERINEURAL at 14:58

## 2018-04-23 RX ADMIN — TRIAMCINOLONE ACETONIDE 40 MG: 40 INJECTION, SUSPENSION INTRA-ARTICULAR; INTRAMUSCULAR at 14:58

## 2018-04-23 NOTE — PROGRESS NOTES
"Flaca Bhandari is a 62 y.o. female returns for     Chief Complaint   Patient presents with   • Right Shoulder - Follow-up, Pain       HISTORY OF PRESENT ILLNESS: 2 month recheck right shoulder   Patient request injection   Right shoulder    Pain scale today 4/10    Complains of pain of the right shoulder, the pain is constant.  The pain is present at the junction of the acromioclavicular joint.  The pain does not radiate.     The pain is worse with activity.  The pain is improved with the physical therapy.       CONCURRENT MEDICAL HISTORY:    Past Medical History:   Diagnosis Date   • Anxiety    • Chronic pain disorder    • Depression    • Hypercholesteremia    • Low back pain        Allergies   Allergen Reactions   • Alendronate      GERD   • Methocarbamol Other (See Comments)     Jittery         Current Outpatient Prescriptions:   •  aspirin 81 MG chewable tablet, Chew 81 mg daily., Disp: , Rfl:   •  atorvastatin (LIPITOR) 80 MG tablet, Take 80 mg by mouth daily., Disp: , Rfl:   •  gabapentin (NEURONTIN) 300 MG capsule, Take 1 capsule by mouth 4 (Four) Times a Day., Disp: 120 capsule, Rfl: 3  •  HYDROcodone-acetaminophen (NORCO)  MG per tablet, TK 1 T PO QID, Disp: , Rfl: 0  •  traZODone (DESYREL) 50 MG tablet, TK 1 T PO Q NIGHT, Disp: , Rfl: 3  •  venlafaxine XR (EFFEXOR-XR) 75 MG 24 hr capsule, , Disp: , Rfl:     Past Surgical History:   Procedure Laterality Date   • BACK SURGERY     • HYSTERECTOMY  1985   • SPINE SURGERY  1997       ROS  No fevers or chills.  No chest pain or shortness of air.  No GI or  disturbances.    PHYSICAL EXAMINATION:       Ht 165.1 cm (65\")   Wt 78 kg (172 lb)   BMI 28.62 kg/m²     Physical Exam    GAIT:     [x]  Normal  []  Antalgic    Assistive device: [x]  None  []  Walker     []  Crutches  []  Cane     []  Wheelchair  []  Stretcher    Right Shoulder Exam     Tenderness   The patient is experiencing tenderness in the acromioclavicular joint.    Range of Motion   Active " Abduction: 130   Passive Abduction: 150     Muscle Strength   Abduction: 5/5   Internal Rotation: 5/5   Supraspinatus: 5/5     Tests   Apprehension: negative  Cross Arm: positive  Impingement: positive    Other   Erythema: absent  Sensation: normal  Pulse: present              Large Joint Arthrocentesis  Date/Time: 4/23/2018 2:58 PM  Consent given by: patient  Site marked: site marked  Timeout: Immediately prior to procedure a time out was called to verify the correct patient, procedure, equipment, support staff and site/side marked as required   Supporting Documentation  Indications: pain   Procedure Details  Location: shoulder - R subacromial bursa  Preparation: Patient was prepped and draped in the usual sterile fashion  Needle size: 22 G  Approach: posterior  Medications administered: 2 mL lidocaine 1 %; 40 mg triamcinolone acetonide 40 MG/ML  Patient tolerance: patient tolerated the procedure well with no immediate complications            ASSESSMENT:    Diagnoses and all orders for this visit:    Chronic right shoulder pain  -     MRI Shoulder Right Arthrogram; Future    Other orders  -     Large Joint Arthrocentesis          PLAN    Injection today right shoulder via subacromial technique, proceed for MRI eval of the right shoulder.    Xray right shoulder      Ismael Jin MD